# Patient Record
Sex: FEMALE | Race: WHITE | NOT HISPANIC OR LATINO | Employment: FULL TIME | URBAN - METROPOLITAN AREA
[De-identification: names, ages, dates, MRNs, and addresses within clinical notes are randomized per-mention and may not be internally consistent; named-entity substitution may affect disease eponyms.]

---

## 2018-03-25 ENCOUNTER — HOSPITAL ENCOUNTER (EMERGENCY)
Facility: HOSPITAL | Age: 28
Discharge: HOME | End: 2018-03-25
Attending: EMERGENCY MEDICINE
Payer: MEDICAID

## 2018-03-25 VITALS
WEIGHT: 110 LBS | OXYGEN SATURATION: 100 % | DIASTOLIC BLOOD PRESSURE: 68 MMHG | TEMPERATURE: 98 F | BODY MASS INDEX: 19.49 KG/M2 | HEART RATE: 101 BPM | SYSTOLIC BLOOD PRESSURE: 111 MMHG | RESPIRATION RATE: 18 BRPM | HEIGHT: 63 IN

## 2018-03-25 DIAGNOSIS — N39.0 URINARY TRACT INFECTION WITH HEMATURIA, SITE UNSPECIFIED: Primary | ICD-10-CM

## 2018-03-25 DIAGNOSIS — R31.9 URINARY TRACT INFECTION WITH HEMATURIA, SITE UNSPECIFIED: Primary | ICD-10-CM

## 2018-03-25 DIAGNOSIS — F41.9 ANXIETY: ICD-10-CM

## 2018-03-25 LAB
ALBUMIN SERPL BCP-MCNC: 4.4 G/DL
ALP SERPL-CCNC: 63 U/L
ALT SERPL W/O P-5'-P-CCNC: 17 U/L
ANION GAP SERPL CALC-SCNC: 17 MMOL/L
AST SERPL-CCNC: 21 U/L
B-HCG UR QL: NEGATIVE
BACTERIA #/AREA URNS HPF: ABNORMAL /HPF
BASOPHILS # BLD AUTO: 0.03 K/UL
BASOPHILS NFR BLD: 0.3 %
BILIRUB SERPL-MCNC: 0.7 MG/DL
BILIRUB UR QL STRIP: NEGATIVE
BUN SERPL-MCNC: 8 MG/DL
CALCIUM SERPL-MCNC: 9.6 MG/DL
CHLORIDE SERPL-SCNC: 98 MMOL/L
CLARITY UR: CLEAR
CO2 SERPL-SCNC: 23 MMOL/L
COLOR UR: YELLOW
CREAT SERPL-MCNC: 0.7 MG/DL
CTP QC/QA: YES
DIFFERENTIAL METHOD: ABNORMAL
EOSINOPHIL # BLD AUTO: 0.1 K/UL
EOSINOPHIL NFR BLD: 0.5 %
ERYTHROCYTE [DISTWIDTH] IN BLOOD BY AUTOMATED COUNT: 12.6 %
EST. GFR  (AFRICAN AMERICAN): >60 ML/MIN/1.73 M^2
EST. GFR  (NON AFRICAN AMERICAN): >60 ML/MIN/1.73 M^2
GLUCOSE SERPL-MCNC: 89 MG/DL
GLUCOSE UR QL STRIP: NEGATIVE
HCT VFR BLD AUTO: 39.3 %
HGB BLD-MCNC: 13.3 G/DL
HGB UR QL STRIP: ABNORMAL
HYALINE CASTS #/AREA URNS LPF: 0 /LPF
KETONES UR QL STRIP: ABNORMAL
LEUKOCYTE ESTERASE UR QL STRIP: ABNORMAL
LYMPHOCYTES # BLD AUTO: 1.1 K/UL
LYMPHOCYTES NFR BLD: 9.3 %
MCH RBC QN AUTO: 31.4 PG
MCHC RBC AUTO-ENTMCNC: 33.8 G/DL
MCV RBC AUTO: 93 FL
MICROSCOPIC COMMENT: ABNORMAL
MONOCYTES # BLD AUTO: 0.8 K/UL
MONOCYTES NFR BLD: 7 %
NEUTROPHILS # BLD AUTO: 9.5 K/UL
NEUTROPHILS NFR BLD: 82.7 %
NITRITE UR QL STRIP: NEGATIVE
PH UR STRIP: 8 [PH] (ref 5–8)
PLATELET # BLD AUTO: 175 K/UL
PMV BLD AUTO: 10.3 FL
POTASSIUM SERPL-SCNC: 3.5 MMOL/L
PROT SERPL-MCNC: 7.5 G/DL
PROT UR QL STRIP: ABNORMAL
RBC # BLD AUTO: 4.23 M/UL
RBC #/AREA URNS HPF: 25 /HPF (ref 0–4)
SODIUM SERPL-SCNC: 138 MMOL/L
SP GR UR STRIP: 1.02 (ref 1–1.03)
URN SPEC COLLECT METH UR: ABNORMAL
UROBILINOGEN UR STRIP-ACNC: NEGATIVE EU/DL
WBC # BLD AUTO: 11.51 K/UL
WBC #/AREA URNS HPF: >100 /HPF (ref 0–5)

## 2018-03-25 PROCEDURE — 63600175 PHARM REV CODE 636 W HCPCS: Performed by: PHYSICIAN ASSISTANT

## 2018-03-25 PROCEDURE — 96372 THER/PROPH/DIAG INJ SC/IM: CPT

## 2018-03-25 PROCEDURE — 87088 URINE BACTERIA CULTURE: CPT

## 2018-03-25 PROCEDURE — 85025 COMPLETE CBC W/AUTO DIFF WBC: CPT

## 2018-03-25 PROCEDURE — 80053 COMPREHEN METABOLIC PANEL: CPT

## 2018-03-25 PROCEDURE — 81000 URINALYSIS NONAUTO W/SCOPE: CPT

## 2018-03-25 PROCEDURE — 87077 CULTURE AEROBIC IDENTIFY: CPT

## 2018-03-25 PROCEDURE — 96360 HYDRATION IV INFUSION INIT: CPT

## 2018-03-25 PROCEDURE — 25000003 PHARM REV CODE 250: Performed by: PHYSICIAN ASSISTANT

## 2018-03-25 PROCEDURE — 87086 URINE CULTURE/COLONY COUNT: CPT

## 2018-03-25 PROCEDURE — 99284 EMERGENCY DEPT VISIT MOD MDM: CPT | Mod: 25

## 2018-03-25 PROCEDURE — 87186 SC STD MICRODIL/AGAR DIL: CPT

## 2018-03-25 PROCEDURE — 81025 URINE PREGNANCY TEST: CPT | Performed by: EMERGENCY MEDICINE

## 2018-03-25 RX ORDER — ONDANSETRON 4 MG/1
4 TABLET, ORALLY DISINTEGRATING ORAL
Status: COMPLETED | OUTPATIENT
Start: 2018-03-25 | End: 2018-03-25

## 2018-03-25 RX ORDER — DIAZEPAM 5 MG/1
5 TABLET ORAL
Status: COMPLETED | OUTPATIENT
Start: 2018-03-25 | End: 2018-03-25

## 2018-03-25 RX ORDER — CIPROFLOXACIN 500 MG/1
500 TABLET ORAL 2 TIMES DAILY
Qty: 14 TABLET | Refills: 0 | Status: SHIPPED | OUTPATIENT
Start: 2018-03-25 | End: 2018-04-01

## 2018-03-25 RX ORDER — DIAZEPAM 2 MG/1
2 TABLET ORAL EVERY 6 HOURS PRN
COMMUNITY

## 2018-03-25 RX ORDER — KETOROLAC TROMETHAMINE 10 MG/1
10 TABLET, FILM COATED ORAL
Status: COMPLETED | OUTPATIENT
Start: 2018-03-25 | End: 2018-03-25

## 2018-03-25 RX ORDER — KETOROLAC TROMETHAMINE 10 MG/1
10 TABLET, FILM COATED ORAL EVERY 6 HOURS
Qty: 12 TABLET | Refills: 0 | Status: SHIPPED | OUTPATIENT
Start: 2018-03-25 | End: 2018-03-28

## 2018-03-25 RX ORDER — CEFTRIAXONE 1 G/1
1 INJECTION, POWDER, FOR SOLUTION INTRAMUSCULAR; INTRAVENOUS
Status: COMPLETED | OUTPATIENT
Start: 2018-03-25 | End: 2018-03-25

## 2018-03-25 RX ORDER — ONDANSETRON 4 MG/1
4 TABLET, ORALLY DISINTEGRATING ORAL EVERY 8 HOURS PRN
Qty: 15 TABLET | Refills: 0 | Status: SHIPPED | OUTPATIENT
Start: 2018-03-25

## 2018-03-25 RX ORDER — CIPROFLOXACIN 500 MG/1
500 TABLET ORAL 2 TIMES DAILY
Qty: 14 TABLET | Refills: 0 | Status: SHIPPED | OUTPATIENT
Start: 2018-03-25 | End: 2018-03-25

## 2018-03-25 RX ADMIN — CEFTRIAXONE SODIUM 1 G: 1 INJECTION, POWDER, FOR SOLUTION INTRAMUSCULAR; INTRAVENOUS at 05:03

## 2018-03-25 RX ADMIN — DIAZEPAM 5 MG: 5 TABLET ORAL at 05:03

## 2018-03-25 RX ADMIN — ONDANSETRON 4 MG: 4 TABLET, ORALLY DISINTEGRATING ORAL at 05:03

## 2018-03-25 RX ADMIN — SODIUM CHLORIDE 1000 ML: 0.9 INJECTION, SOLUTION INTRAVENOUS at 06:03

## 2018-03-25 RX ADMIN — KETOROLAC TROMETHAMINE 10 MG: 10 TABLET, FILM COATED ORAL at 05:03

## 2018-03-25 NOTE — ED NOTES
Pressure with urination that began 1 day ago.  Pain to right flank.  Denies n/v/fever.  Denies burning with urination but feels constant pressure to void.

## 2018-03-25 NOTE — ED PROVIDER NOTES
Encounter Date: 3/25/2018       History     Chief Complaint   Patient presents with    Dysuria     Onset yesterday with pressure and urinary frequency, now today with RLQ pain. Denies N/V, denies fever, but chills yesterday     Afebrile 27-year-old female with past medical history of anxiety presents to the ED for evaluation of dysuria.  She states symptoms began few days ago.  She states that the pain has gradually worsened since onset and she now has a complaint urinary pressure, frequency and some lower abdominal pain.  She does report that the pain radiates to right lower quadrant and is worse with urination.  She states that she is currently menstruating and denies any gross hematuria.  She denies any fever, nausea, vomiting diarrhea or constipation.  She has not tried any medications for symptoms.  She does report that she is quite anxious and that hospitals make her even more anxious.  She does take Valium or Xanax for episodes of this however did not take any today.      The history is provided by the patient.     Review of patient's allergies indicates:  No Known Allergies  Past Medical History:   Diagnosis Date    Anxiety      History reviewed. No pertinent surgical history.  Family History   Problem Relation Age of Onset    Breast cancer Neg Hx     Colon cancer Neg Hx     Ovarian cancer Neg Hx      Social History   Substance Use Topics    Smoking status: Current Every Day Smoker     Packs/day: 0.50    Smokeless tobacco: Never Used    Alcohol use No     Review of Systems   Constitutional: Negative for chills and fever.   HENT: Negative for sore throat.    Respiratory: Negative for shortness of breath.    Cardiovascular: Negative for chest pain.   Gastrointestinal: Positive for abdominal pain. Negative for constipation, diarrhea, nausea and vomiting.   Genitourinary: Positive for dysuria and vaginal bleeding. Negative for flank pain, hematuria and vaginal discharge.   Musculoskeletal: Negative for  back pain.   Skin: Negative for rash.   Neurological: Negative for weakness.   Hematological: Does not bruise/bleed easily.   Psychiatric/Behavioral: The patient is nervous/anxious.        Physical Exam     Initial Vitals [03/25/18 1632]   BP Pulse Resp Temp SpO2   124/69 (!) 123 16 98.2 °F (36.8 °C) 98 %      MAP       87.33         Physical Exam    Nursing note and vitals reviewed.  Constitutional: Vital signs are normal. She appears well-developed and well-nourished. She is cooperative.  Non-toxic appearance. She does not appear ill. She appears distressed.   HENT:   Head: Normocephalic and atraumatic.   Eyes: Conjunctivae and lids are normal.   Neck: Neck supple. No neck rigidity.   Cardiovascular: Normal rate and regular rhythm.   Pulmonary/Chest: Breath sounds normal. No respiratory distress. She has no wheezes. She has no rhonchi.   Abdominal: Soft. Normal appearance and bowel sounds are normal. There is tenderness in the suprapubic area. There is no rigidity, no rebound, no guarding and no CVA tenderness.   Mild suprapubic tenderness; no rigidity, rebound or guarding.   Musculoskeletal: Normal range of motion.   Neurological: She is alert and oriented to person, place, and time. GCS eye subscore is 4. GCS verbal subscore is 5. GCS motor subscore is 6.   Skin: Skin is warm, dry and intact. No rash noted.   Psychiatric: She has a normal mood and affect. Her speech is normal and behavior is normal. Thought content normal.         ED Course   Procedures  Labs Reviewed   URINALYSIS - Abnormal; Notable for the following:        Result Value    Protein, UA 2+ (*)     Ketones, UA 1+ (*)     Occult Blood UA 3+ (*)     Leukocytes, UA 2+ (*)     All other components within normal limits   URINALYSIS MICROSCOPIC - Abnormal; Notable for the following:     RBC, UA 25 (*)     WBC, UA >100 (*)     Bacteria, UA Moderate (*)     All other components within normal limits   CBC W/ AUTO DIFFERENTIAL - Abnormal; Notable for the  following:     MCH 31.4 (*)     Gran # (ANC) 9.5 (*)     Gran% 82.7 (*)     Lymph% 9.3 (*)     All other components within normal limits   COMPREHENSIVE METABOLIC PANEL - Abnormal; Notable for the following:     Anion Gap 17 (*)     All other components within normal limits   CULTURE, URINE   POCT URINE PREGNANCY             Medical Decision Making:   Initial Assessment:   27-year-old presents for evaluation of urinary symptoms which include suprapubic pain, urinary pressure, urgency, frequency and dysuria.  She denies any fever however endorses chills.  Patient does appear quite anxious but otherwise well appearing and nontoxic.  Mild suprapubic tenderness on exam with no rigidity, rebound or guarding remaining exam is essentially unremarkable with exception of previously mentioned anxiousness and tachycardia.  Differential Diagnosis:   UTI, pyelonephritis, kidney stone, dysmenorrhea ovarian cyst, cervicitis, PID, anxiety  ED Management:  UA does reveal infectious process with moderate bacteria, leukocytes and over 100 white blood cells.  She does have noted hematuria although this is more probable to be vaginal source as she is currently menstruating and denies any gross hematuria as well as prior self or history kidney stones.  We did consider pyelonephritis and/or kidney stones although low suspicion at this time as no CVA tenderness, flank tenderness, fever, vomiting and patient does appear well.  Patient was noted to have 1+ ketones and and 2+ proteinuria and as her tachycardia improved all he modestly with Valium we did obtain labs elected to give patient IV fluids.  Labs are unremarkable and tachycardia did improve.  1 g of IV Rocephin was given in the ED for UTI and pain improve subjectively with Toradol.  She is good candidate this time for outpatient treatment of complicated UTI we'll be started on ciprofloxacin with culture pending. Patient was cautioned on when to return to ED. Patient verbalized  understanding and agreement with the treatment plan                Attending Attestation:     Physician Attestation Statement for NP/PA:   I discussed this assessment and plan of this patient with the NP/PA, but I did not personally examine the patient. The face to face encounter was performed by the NP/PA.                     Clinical Impression:     1. Urinary tract infection with hematuria, site unspecified    2. Anxiety                                 MEGAN Larson  03/27/18 1007

## 2018-03-27 LAB — BACTERIA UR CULT: NORMAL

## 2018-03-27 NOTE — PROVIDER PROGRESS NOTES - EMERGENCY DEPT.
Encounter Date: 3/25/2018    ED Physician Progress Notes         03/27/2018 8:47 AM patient was sent home with Ciprofloxacin awaiting sensitivity. LC    03/27/2018 5:32 PM culture is sensitive ciprofloxacin. LC

## 2018-08-07 ENCOUNTER — HOSPITAL ENCOUNTER (EMERGENCY)
Facility: HOSPITAL | Age: 28
Discharge: HOME | End: 2018-08-07
Attending: EMERGENCY MEDICINE
Payer: MEDICAID

## 2018-08-07 VITALS
TEMPERATURE: 98 F | SYSTOLIC BLOOD PRESSURE: 124 MMHG | OXYGEN SATURATION: 99 % | RESPIRATION RATE: 16 BRPM | BODY MASS INDEX: 19.14 KG/M2 | HEIGHT: 63 IN | WEIGHT: 108 LBS | DIASTOLIC BLOOD PRESSURE: 73 MMHG | HEART RATE: 99 BPM

## 2018-08-07 DIAGNOSIS — S01.81XA CHIN LACERATION, INITIAL ENCOUNTER: ICD-10-CM
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
## 2018-08-07 PROCEDURE — 12011 RPR F/E/E/N/L/M 2.5 CM/<: CPT

## 2018-08-07 PROCEDURE — 63600175 PHARM REV CODE 636 W HCPCS: Performed by: EMERGENCY MEDICINE

## 2018-08-07 PROCEDURE — 90471 IMMUNIZATION ADMIN: CPT | Performed by: EMERGENCY MEDICINE

## 2018-08-07 PROCEDURE — 25000003 PHARM REV CODE 250: Performed by: EMERGENCY MEDICINE

## 2018-08-07 PROCEDURE — 99283 EMERGENCY DEPT VISIT LOW MDM: CPT | Mod: 25

## 2018-08-07 PROCEDURE — 90715 TDAP VACCINE 7 YRS/> IM: CPT | Performed by: EMERGENCY MEDICINE

## 2018-08-07 RX ORDER — LIDOCAINE HYDROCHLORIDE AND EPINEPHRINE 10; 10 MG/ML; UG/ML
10 INJECTION, SOLUTION INFILTRATION; PERINEURAL
Status: COMPLETED | OUTPATIENT
Start: 2018-08-07 | End: 2018-08-07

## 2018-08-07 RX ADMIN — Medication 1 ML: at 04:08

## 2018-08-07 RX ADMIN — LIDOCAINE HYDROCHLORIDE,EPINEPHRINE BITARTRATE 10 ML: 10; .01 INJECTION, SOLUTION INFILTRATION; PERINEURAL at 04:08

## 2018-08-07 RX ADMIN — CLOSTRIDIUM TETANI TOXOID ANTIGEN (FORMALDEHYDE INACTIVATED), CORYNEBACTERIUM DIPHTHERIAE TOXOID ANTIGEN (FORMALDEHYDE INACTIVATED), BORDETELLA PERTUSSIS TOXOID ANTIGEN (GLUTARALDEHYDE INACTIVATED), BORDETELLA PERTUSSIS FILAMENTOUS HEMAGGLUTININ ANTIGEN (FORMALDEHYDE INACTIVATED), BORDETELLA PERTUSSIS PERTACTIN ANTIGEN, AND BORDETELLA PERTUSSIS FIMBRIAE 2/3 ANTIGEN 0.5 ML: 5; 2; 2.5; 5; 3; 5 INJECTION, SUSPENSION INTRAMUSCULAR at 04:08

## 2018-08-07 NOTE — ED PROVIDER NOTES
Encounter Date: 8/7/2018    SCRIBE #1 NOTE: I, Manny Hernandez, am scribing for, and in the presence of,  Dr. Rucker. I have scribed the entire note.       History     Chief Complaint   Patient presents with    Laceration     ambulatory to triage with chin laceration. states she fell and hit her chin onto the floor. denies pain anywhere else and denies loc or hitting the head       has a past medical history of Anxiety.     The patient presents to the ED due to chin laceration that occurred prior to arrival.  Significant other is present with patient, who states she has been drinking and had a fall in the bathroom, hitting her chin on tile floor.  He knows the laceration and brought her to the ER for evaluation and management. She denies any head trauma, neck/back pain, LOC, or associated injuries. Patient is currently unsure of Tetanus status.       The history is provided by the patient.     Review of patient's allergies indicates:  No Known Allergies  Past Medical History:   Diagnosis Date    Anxiety      History reviewed. No pertinent surgical history.  Family History   Problem Relation Age of Onset    Breast cancer Neg Hx     Colon cancer Neg Hx     Ovarian cancer Neg Hx      Social History   Substance Use Topics    Smoking status: Current Every Day Smoker     Packs/day: 0.50    Smokeless tobacco: Never Used    Alcohol use 15.0 oz/week     25 Shots of liquor per week     Review of Systems   Constitutional: Negative for chills and fever.   HENT: Negative for congestion, rhinorrhea and sore throat.    Eyes: Negative for redness and visual disturbance.   Respiratory: Negative for cough, shortness of breath and wheezing.    Cardiovascular: Negative for chest pain and palpitations.   Gastrointestinal: Negative for abdominal pain, diarrhea, nausea and vomiting.   Genitourinary: Negative for dysuria and hematuria.   Musculoskeletal: Negative for back pain, myalgias and neck pain.   Skin: Positive for wound. Negative  for rash.   Neurological: Negative for dizziness, syncope, weakness and light-headedness.   Psychiatric/Behavioral: Negative for confusion.   All other systems reviewed and are negative.      Physical Exam     Initial Vitals [08/07/18 0339]   BP Pulse Resp Temp SpO2   (!) 140/87 (!) 124 20 97.7 °F (36.5 °C) 98 %      MAP       --         Physical Exam    Nursing note and vitals reviewed.  Constitutional: She appears well-developed and well-nourished. No distress.   HENT:   Head: Normocephalic. Head is with laceration.   Nose: Nose normal.   Mouth/Throat: Oropharynx is clear and moist.   Eyes: EOM are normal. Pupils are equal, round, and reactive to light.   Neck: Normal range of motion. Neck supple. No tracheal deviation present.       Cardiovascular: Normal rate, regular rhythm, normal heart sounds and intact distal pulses.   Pulmonary/Chest: Breath sounds normal. No stridor. No respiratory distress. She has no wheezes.   Abdominal: Soft. She exhibits no distension. There is no tenderness.   Musculoskeletal: Normal range of motion. She exhibits no edema.   Neurological: She is alert and oriented to person, place, and time. She has normal strength. No cranial nerve deficit or sensory deficit.   Skin: Skin is warm and dry. Capillary refill takes less than 2 seconds. No rash noted.   2.5 cm laceration underneath chin. No active bleeding.   Psychiatric: Thought content normal.         ED Course   Lac Repair  Date/Time: 8/7/2018 4:55 AM  Performed by: ROWDY DUNN  Authorized by: ROWDY DUNN   Consent Done: Emergent Situation  Body area: head/neck  Location details: chin  Laceration length: 2.5 cm  Foreign bodies: no foreign bodies  Tendon involvement: none  Nerve involvement: none  Vascular damage: no  Anesthesia: local infiltration    Anesthesia:  Local Anesthetic: lidocaine 1% with epinephrine and LET (lido,epi,tetracaine)  Anesthetic total: 2 mL  Patient sedated: no  Preparation: Patient was prepped and  draped in the usual sterile fashion.  Irrigation solution: saline  Irrigation method: syringe  Amount of cleaning: standard  Debridement: none  Degree of undermining: none  Skin closure: 5-0 Prolene  Number of sutures: 3  Technique: simple and running  Approximation: close  Approximation difficulty: simple  Patient tolerance: Patient tolerated the procedure well with no immediate complications        Labs Reviewed - No data to display       Imaging Results    None          Medical Decision Making:   Initial Assessment:   Trista Anthony is a 27 y.o. female who presents with chin laceration after mechanical fall.   Plan to update tetanus, irrigate and close at bedside.  Differential Diagnosis:   Differential Diagnosis includes, but is not limited to:  Open fracture, vascular injury, tendon/ligament injury, nerve injury, retained foreign body, superficial laceration, abrasion.    ED Management:  After complete evaluation, including thorough history and physical exam, the patient's injury and laceration was deemed to be appropriate for primary closure in the ED. The wound was irrigated extensively and inspected for foreign body, underlying structure injury, or other associated complications, and none were found. The wound was repaired using 5-0 Prolene x3. The patient was given appropriate wound care instructions, including keeping wound clean/dry, use of sterile bandages, and avoiding submersion in standing water. Due to the nature of the wound, no antibiotics were deemed necessary. The patient was instructed to regularly monitor the wound for any evidence of infection, including redness, fever, or drainage. The patient was counseled on follow-up with PCP or return to ER in 7 days for wound re-check and suture removal. Patient stated understanding and comfortable with plan of care.     Upon re-evaluation, the patient's status has improved.  After complete ED evaluation, clinical impression is most consistent with  chin laceration.  At this time, I feel there is no emergent condition requiring further evaluation or admission. I believe the patient is stable for discharge from the ED. The patient and any additional family present were updated with test results, overall clinical impression, and recommended further plan of care. All questions were answered. The patient expressed understanding and agreed with current plan for discharge with PCP follow-up within 1 week. Strict return precautions were provided, including return/worsening of current symptoms or any other concerns.                         Clinical Impression:     1. Fall from standing, initial encounter    2. Chin laceration, initial encounter            Disposition:   Disposition: Discharged  Condition: Stable       I, Dr. Marlon Rucker, personally performed the services described in this documentation. All medical record entries made by the scribe were at my direction and in my presence.  I have reviewed the chart and agree that the record reflects my personal performance and is accurate and complete.     Marlon Rucker MD.  5:21 AM 08/07/2018                     Marlon Rucker MD  08/07/18 0521

## 2019-11-10 ENCOUNTER — OFFICE VISIT (OUTPATIENT)
Dept: URGENT CARE | Facility: CLINIC | Age: 29
End: 2019-11-10
Payer: MEDICAID

## 2019-11-10 VITALS
HEART RATE: 105 BPM | SYSTOLIC BLOOD PRESSURE: 120 MMHG | RESPIRATION RATE: 16 BRPM | OXYGEN SATURATION: 98 % | DIASTOLIC BLOOD PRESSURE: 80 MMHG | TEMPERATURE: 98 F | BODY MASS INDEX: 25.52 KG/M2 | WEIGHT: 144 LBS | HEIGHT: 63 IN

## 2019-11-10 DIAGNOSIS — B96.89 ACUTE BACTERIAL SINUSITIS: Primary | ICD-10-CM

## 2019-11-10 DIAGNOSIS — J01.90 ACUTE BACTERIAL SINUSITIS: Primary | ICD-10-CM

## 2019-11-10 PROCEDURE — 99203 PR OFFICE/OUTPT VISIT, NEW, LEVL III, 30-44 MIN: ICD-10-PCS | Mod: S$GLB,,, | Performed by: NURSE PRACTITIONER

## 2019-11-10 PROCEDURE — 99203 OFFICE O/P NEW LOW 30 MIN: CPT | Mod: S$GLB,,, | Performed by: NURSE PRACTITIONER

## 2019-11-10 RX ORDER — AMOXICILLIN 875 MG/1
875 TABLET, FILM COATED ORAL 2 TIMES DAILY
Qty: 20 TABLET | Refills: 0 | Status: SHIPPED | OUTPATIENT
Start: 2019-11-10 | End: 2019-11-20

## 2019-11-10 NOTE — PATIENT INSTRUCTIONS

## 2019-11-10 NOTE — PROGRESS NOTES
"Subjective:       Patient ID: Trista Anthony is a 28 y.o. female.    Vitals:  height is 5' 3" (1.6 m) and weight is 65.3 kg (144 lb). Her temperature is 98.3 °F (36.8 °C). Her blood pressure is 120/80 and her pulse is 105. Her respiration is 16 and oxygen saturation is 98%.     Chief Complaint: Sinus Problem    Trista is a A0 31w6d WGA female who presents to clinic c/o sinus congestion for the past two weeks. She states symptoms started two weeks ago, briefly resolved, and then returned this past week. Associated sx include non-productive cough, sinus pressure, headache, and sore throat. Pt denies fever, chills, or N/V/D. Reports sinus pressure, cough and sneezing.  Has gone to the ER last week and had a negative flu test.  Has taken theraful.      Sinus Problem   This is a new problem. The current episode started 1 to 4 weeks ago. The problem has been gradually worsening since onset. There has been no fever. Associated symptoms include congestion, coughing, sinus pressure and a sore throat. Pertinent negatives include no chills, diaphoresis, ear pain or shortness of breath. Past treatments include acetaminophen and oral decongestants. The treatment provided mild relief.       Constitution: Negative for chills, sweating, fatigue and fever.   HENT: Positive for congestion, sinus pain, sinus pressure and sore throat. Negative for ear pain and voice change.    Neck: Negative for painful lymph nodes.   Eyes: Negative for eye redness.   Respiratory: Positive for cough. Negative for chest tightness, sputum production, bloody sputum, COPD, shortness of breath, stridor, wheezing and asthma.    Gastrointestinal: Negative for nausea and vomiting.   Musculoskeletal: Negative for muscle ache.   Skin: Negative for rash.   Allergic/Immunologic: Negative for seasonal allergies and asthma.   Hematologic/Lymphatic: Negative for swollen lymph nodes.       Objective:      Physical Exam   Constitutional: She is oriented to " person, place, and time. She appears well-developed and well-nourished. She is cooperative.  Non-toxic appearance. She does not have a sickly appearance. She does not appear ill. No distress.   HENT:   Head: Normocephalic and atraumatic.   Right Ear: Hearing, tympanic membrane, external ear and ear canal normal.   Left Ear: Hearing, tympanic membrane, external ear and ear canal normal.   Nose: Mucosal edema and rhinorrhea present. No nasal deformity. No epistaxis. Right sinus exhibits no maxillary sinus tenderness and no frontal sinus tenderness. Left sinus exhibits no maxillary sinus tenderness and no frontal sinus tenderness.   Mouth/Throat: Uvula is midline, oropharynx is clear and moist and mucous membranes are normal. No trismus in the jaw. Normal dentition. No uvula swelling. No oropharyngeal exudate, posterior oropharyngeal edema or posterior oropharyngeal erythema.   Eyes: Conjunctivae and lids are normal. No scleral icterus.   Neck: Trachea normal, full passive range of motion without pain and phonation normal. Neck supple. No neck rigidity. No edema and no erythema present.   Cardiovascular: Normal rate, regular rhythm, normal heart sounds, intact distal pulses and normal pulses.   Pulmonary/Chest: Effort normal. No accessory muscle usage. No respiratory distress. She has no decreased breath sounds. She has rhonchi in the left lower field.   Abdominal: Normal appearance.   Musculoskeletal: Normal range of motion. She exhibits no edema or deformity.   Neurological: She is alert and oriented to person, place, and time. She exhibits normal muscle tone. Coordination normal.   Skin: Skin is warm, dry, intact, not diaphoretic and not pale.   Psychiatric: She has a normal mood and affect. Her speech is normal and behavior is normal. Judgment and thought content normal. Cognition and memory are normal.   Nursing note and vitals reviewed.        Assessment:       1. Acute bacterial sinusitis        Plan:        Avoid theraflu.  Update OB/Gyn on status    Acute bacterial sinusitis      Patient Instructions       Acute Bacterial Rhinosinusitis (ABRS)    Acute bacterial rhinosinusitis (ABRS) is an infection of your nasal cavity and sinuses. Its caused by bacteria. Acute means that youve had symptoms for less than 12 weeks.  Understanding your sinuses  The nasal cavity is the large air-filled space behind your nose. The sinuses are a group of spaces formed by the bones of your face. They connect with your nasal cavity. ABRS causes the tissue lining these spaces to become inflamed. Mucus may not drain normally. This leads to facial pain and other symptoms.  What causes ABRS?  ABRS most often follows an upper respiratory infection caused by a virus. Bacteria then infect the lining of your nasal cavity and sinuses. But you can also get ABRS if you have:  · Nasal allergies  · Long-term nasal swelling and congestion not caused by allergies  · Blockage in the nose  Symptoms of ABRS  The symptoms of ABRS may be different for each person, and can include:  · Nasal congestion  · Runny nose  · Fluid draining from the nose down the throat (postnasal drip)  · Headache  · Cough  · Pain in the sinuses  · Thick, colored fluid from the nose (mucus)  · Fever  Diagnosing ABRS  ABRS may be diagnosed if youve had an upper respiratory infection like a cold and cough for longer than 10 to 14 days. Your health care provider will ask about your symptoms and your medical history. The provider will check your vital signs, including your temperature. Youll have a physical exam. The health care provider will check your ears, nose, and throat. You likely wont need any tests. If ABRS comes back, you may have a culture or other tests.  Treatment for ABRS  Treatment may include:  · Antibiotic medicine. This is for symptoms that last for at least 10 to 14 days.  · Nasal corticosteroid medicine. Drops or spray used in the nose can lessen swelling and  congestion.  · Over-the-counter pain medicine. This is to lessen sinus pain and pressure.  · Nasal decongestant medicine. Spray or drops may help to lessen congestion. Do not use them for more than a few days.  · Salt wash (saline irrigation). This can help to loosen mucus.  Possible complications of ABRS  ABRS may come back or become long-term (chronic).  In rare cases, ABRS may cause complications such as:   · Inflamed tissue around the brain and spinal cord (meningitis)  · Inflamed tissue around the eyes (orbital cellulitis)  · Inflamed bones around the sinuses (osteitis)  These problems may need to be treated in a hospital with intravenous (IV) antibiotic medicine or surgery.  When to call the health care provider  Call your health care provider if you have any of the following:  · Symptoms that dont get better, or get worse  · Symptoms that dont get better after 3 to 5 days on antibiotics  · Trouble seeing  · Swelling around your eyes  · Confusion or trouble staying awake   Date Last Reviewed: 3/3/2015  © 6051-1648 [employer]. [address] [place] [address], [place] [address]. All rights reserved. This information is not intended as a substitute for professional medical care. Always follow your healthcare professional's instructions.

## 2019-12-16 ENCOUNTER — TELEPHONE (OUTPATIENT)
Dept: PEDIATRICS | Facility: CLINIC | Age: 29
End: 2019-12-16

## 2019-12-16 NOTE — TELEPHONE ENCOUNTER
----- Message from Angélica King sent at 12/16/2019 12:42 PM CST -----  Patient Requesting Sooner Appointment.     Reason for sooner appt.: Pt is induced on 1/3/2020    When is the first available appointment? 1/8/2020    Communication Preference: Trista- [phone]    Additional Information: Mom would like a meet & greet before the baby is born. Please call to advise.

## 2019-12-23 ENCOUNTER — OFFICE VISIT (OUTPATIENT)
Dept: PEDIATRICS | Facility: CLINIC | Age: 29
End: 2019-12-23
Payer: MEDICAID

## 2019-12-23 DIAGNOSIS — Z76.81 PEDIATRIC PRE-BIRTH VISIT FOR EXPECTANT PARENT(S): Primary | ICD-10-CM

## 2019-12-23 PROCEDURE — 99499 NO LOS: ICD-10-PCS | Mod: S$PBB,,, | Performed by: PEDIATRICS

## 2019-12-23 PROCEDURE — 99499 UNLISTED E&M SERVICE: CPT | Mod: S$PBB,,, | Performed by: PEDIATRICS

## 2019-12-23 NOTE — PROGRESS NOTES
Mother 29 year old with no PMHx currently at 38 WGA  - will deliver at [facility]. No complications during pregnancy. Discussed clinic processes, exceptations, vaccines, and answered all questions.